# Patient Record
Sex: FEMALE | Race: WHITE | NOT HISPANIC OR LATINO | Employment: FULL TIME | URBAN - METROPOLITAN AREA
[De-identification: names, ages, dates, MRNs, and addresses within clinical notes are randomized per-mention and may not be internally consistent; named-entity substitution may affect disease eponyms.]

---

## 2020-12-02 LAB
ABO GROUP BLD: NORMAL
HIV 1+2 AB+HIV1 P24 AG SERPL QL IA: NEGATIVE
RH BLD: NORMAL
RUBV IGG SERPL IA-ACNC: NORMAL

## 2021-03-09 ENCOUNTER — TELEPHONE (OUTPATIENT)
Dept: OBGYN | Facility: CLINIC | Age: 26
End: 2021-03-09

## 2021-03-12 ENCOUNTER — GYNECOLOGY VISIT (OUTPATIENT)
Dept: OBGYN | Facility: CLINIC | Age: 26
End: 2021-03-12
Payer: COMMERCIAL

## 2021-03-12 DIAGNOSIS — E05.00 GRAVES DISEASE: ICD-10-CM

## 2021-03-12 DIAGNOSIS — Z3A.18 18 WEEKS GESTATION OF PREGNANCY: ICD-10-CM

## 2021-03-12 PROCEDURE — 59401 PR NEW OB VISIT: CPT | Performed by: OBSTETRICS & GYNECOLOGY

## 2021-03-12 NOTE — PROGRESS NOTES
Cc: New OB visit    HPI:  The patient is a 25 y.o.  3 para 0-0-2-0 at 18 weeks 6 days gestational age who presents as transfer care from [place].  Patient has had prenatal care prior to moving to [facility].  Records have been requested.    Patient has history of Graves' disease.  Currently being seen by endocrinology.  On PTU for management.    She presents for her new obstetric visit.    She denies fetal movement, denies vaginal bleeding, denies leakage of fluid, denies contractions.     She denies nausea/vomiting, headaches, or urinary symptoms.      OB History   No obstetric history on file.     Past Medical History:   Diagnosis Date   • Heart murmur      History reviewed. No pertinent surgical history.  Social History     Socioeconomic History   • Marital status: Single     Spouse name: Not on file   • Number of children: Not on file   • Years of education: Not on file   • Highest education level: Not on file   Occupational History   • Not on file   Tobacco Use   • Smoking status: Not on file   Substance and Sexual Activity   • Alcohol use: Not on file   • Drug use: Not on file   • Sexual activity: Not on file   Other Topics Concern   • Not on file   Social History Narrative   • Not on file     Social Determinants of Health     Financial Resource Strain:    • Difficulty of Paying Living Expenses:    Food Insecurity:    • Worried About Running Out of Food in the Last Year:    • Ran Out of Food in the Last Year:    Transportation Needs:    • Lack of Transportation (Medical):    • Lack of Transportation (Non-Medical):    Physical Activity:    • Days of Exercise per Week:    • Minutes of Exercise per Session:    Stress:    • Feeling of Stress :    Social Connections:    • Frequency of Communication with Friends and Family:    • Frequency of Social Gatherings with Friends and Family:    • Attends Worship Services:    • Active Member of Clubs or Organizations:    • Attends Club or Organization Meetings:    •  Marital Status:    Intimate Partner Violence:    • Fear of Current or Ex-Partner:    • Emotionally Abused:    • Physically Abused:    • Sexually Abused:      Family History   Problem Relation Age of Onset   • Diabetes Maternal Grandmother      Allergies:   Allergies as of 03/12/2021   • (Not on File)       PE:    There were no vitals taken for this visit.    General: no apparent distress, is well developed and well nourished  Head: normocephalic, atraumatic  Neck: neck is supple, non-tender, no thyromegaly, full range of motion  CVS: regular rate and rhythm, no peripheral edema  Lungs: Normal respiratory effort. Clear to auscultation bilaterally  Abdomen: Bowel sounds positive, nondistended, soft, nontender x4, no rebound or guarding.  Skin: No rashes, or ulcers or lesions seen  Psychiatric: Patient shows appropriate affect, is alert and oriented x3, intact judgment and insight.        A/P:  25 y.o. No obstetric history on file. Unknown based upon  No LMP recorded..  She is here for her new obstetric visit.    1. Graves disease  REFERRAL TO PERINATOLOGY   2. 18 weeks gestation of pregnancy         1. Ultrasound for dates and anatomy will be scheduled with high risk pregnancy center.  2.  Patient reports she obtained NIPT with her prior provider.  Awaiting results  3.  Patient has already had prenatal labs drawn with her prior provider, awaiting results.  4.  NOB packet given with ACOG prenatal book.  5.  Increase water intake and encouraged healthy nutrition.  6.  Referral to M made secondary to Graves' disease.  7.  Continue prenatal vitamins.  8.  Follow-up in 4 weeks.   9.  SAB precautions educated.    Patient reports she recently had her thyroid levels drawn.  Being seen by endocrinologist in [place].  Will need levels drawn every trimester    Patient will likely need TRAb levels drawn.  Referral to perinatology has been made.    Fetal heart tones 140 bpm    On ASA 81 mg prophylaxis    All questions answered

## 2021-03-27 ENCOUNTER — NURSE TRIAGE (OUTPATIENT)
Dept: HEALTH INFORMATION MANAGEMENT | Facility: OTHER | Age: 26
End: 2021-03-27

## 2021-03-27 NOTE — TELEPHONE ENCOUNTER
"Pt called stating she is about 21 weeks pregnant and is having left side cramping since around 10/11 this am (4-5 hours). Pt was told she has a fibroid on this side. Pt was told about prune juice for constipation.  Messaged OB  won massey for pt questions in regards to pain medication/bowel regimen.    Reason for Disposition  • Mild abdominal pain    Additional Information  • Negative: Followed an abdomen (stomach) injury  • Negative: [1] Having contractions or other symptoms of labor (such as vaginal pressure) AND [2] >= 37 weeks pregnant (i.e., term pregnancy)  • Negative: [1] Having contractions or other symptoms of labor (such as vaginal pressure) AND [2] < 37 weeks pregnant (i.e., )  • Negative: [1] Abdominal pain AND [2] pregnant < 20 weeks  • Negative: [1] Vomiting AND [2] contains red blood or black (\"coffee ground\") material  (Exception: few red streaks in vomit that only happened once)  • Negative: MODERATE-SEVERE abdominal pain (e.g., interferes with normal activities, awakens from sleep)  • Negative: Vaginal bleeding or spotting  • Negative: [1] Baby moving less today (e.g., kick count < 5 in 1 hour or < 10 in 2 hours) AND [2] pregnant 23 or more weeks  • Negative: Leakage of fluid from vagina  • Negative: New hand or face swelling  • Negative: New blurred vision or vision changes  • Negative: [1] SEVERE headache AND [2] not relieved with acetaminophen (e.g., Tylenol)  • Negative: [1] Upper abdominal pains AND [2] radiate into chest, with sour taste in mouth  • Negative: [1] Upper abdominal pains AND [2] occur regularly about 1 hour after meals  • Negative: Abdominal pain is a chronic symptom (recurrent or ongoing AND present > 4 weeks)  • Negative: Occasional brief sharp vaginal pains in third trimester, questions about  • Negative: Round ligament pain (previously diagnosed by physician), questions about  • Negative: Treating diarrhea, questions about  • Negative: Treating vomiting, " "questions about  • Negative: Unusual vaginal discharge (e.g., bad smelling, yellow, green, or foamy-white)  • Negative: Pain or burning with passing urine (urination)  • Negative: [1] MILD abdominal pain (e.g., doesn't interfere with normal activities) AND [2] constant AND [3] present > 2 hours  • Negative: [1] Intermittent lower abdominal pain AND [2] present > 24 hours  • Negative: Fever > 100.4 F (38.0 C)  • Negative: Blood in urine (red, pink, or tea-colored)  • Negative: White of the eyes have turned yellow (i.e., jaundice)  • Negative: Patient sounds very sick or weak to the triager    Answer Assessment - Initial Assessment Questions  1. LOCATION: \"Where does it hurt?\"       Left lower abdomen  2. RADIATION: \"Does the pain shoot anywhere else?\" (e.g., chest, back)      no  3. ONSET: \"When did the pain begin?\" (Minutes, hours or days ago)      today  4. ONSET: \"Gradual or sudden onset?\"      sudden  5. PATTERN: \"Does the pain come and go, or has it been constant since it started?\"       constant  6. SEVERITY: \"How bad is the pain?\" \"What does it keep you from doing?\"  (e.g., Scale 1-10; mild, moderate, or severe)    - MILD (1-3): doesn't interfere with normal activities, abdomen soft and not tender to touch     - MODERATE (4-7): interferes with normal activities or awakens from sleep, tender to touch     - SEVERE (8-10): excruciating pain, doubled over, unable to do any normal activities      6 with pain, 3 or 4 with walking  7. RECURRENT SYMPTOM: \"Have you ever had this type of abdominal pain before?\" If so, ask: \"When was the last time?\" and \"What happened that time?\"       no  8. CAUSE: \"What do you think is causing the abdominal pain?      Not sure  9. RELIEVING/AGGRAVATING FACTORS: \"What makes it better or worse?\" (e.g., antacids, bowel movement, movement)      Relieving with movement  10. OTHER SYMPTOMS: \"Has there been any vaginal bleeding, fever, vomiting, diarrhea, or urine problems?\"        no  11. " "HORACE: \"What date are you expecting to deliver?\"        August 4    Protocols used: PREGNANCY - ABDOMINAL PAIN GREATER THAN 20 WEEKS EGA-A-      "

## 2021-03-29 ENCOUNTER — APPOINTMENT (OUTPATIENT)
Dept: RADIOLOGY | Facility: MEDICAL CENTER | Age: 26
End: 2021-03-29
Attending: OBSTETRICS & GYNECOLOGY
Payer: COMMERCIAL

## 2021-03-29 ENCOUNTER — HOSPITAL ENCOUNTER (EMERGENCY)
Facility: MEDICAL CENTER | Age: 26
End: 2021-03-29
Attending: OBSTETRICS & GYNECOLOGY | Admitting: OBSTETRICS & GYNECOLOGY
Payer: COMMERCIAL

## 2021-03-29 VITALS
BODY MASS INDEX: 32.78 KG/M2 | DIASTOLIC BLOOD PRESSURE: 77 MMHG | TEMPERATURE: 98 F | HEIGHT: 64 IN | SYSTOLIC BLOOD PRESSURE: 122 MMHG | HEART RATE: 91 BPM | WEIGHT: 192 LBS | RESPIRATION RATE: 18 BRPM

## 2021-03-29 PROCEDURE — A9270 NON-COVERED ITEM OR SERVICE: HCPCS | Performed by: OBSTETRICS & GYNECOLOGY

## 2021-03-29 PROCEDURE — 76815 OB US LIMITED FETUS(S): CPT

## 2021-03-29 PROCEDURE — 99284 EMERGENCY DEPT VISIT MOD MDM: CPT

## 2021-03-29 PROCEDURE — 99284 EMERGENCY DEPT VISIT MOD MDM: CPT | Performed by: OBSTETRICS & GYNECOLOGY

## 2021-03-29 PROCEDURE — 700102 HCHG RX REV CODE 250 W/ 637 OVERRIDE(OP): Performed by: OBSTETRICS & GYNECOLOGY

## 2021-03-29 RX ORDER — ACETAMINOPHEN 500 MG
1000 TABLET ORAL ONCE
Status: COMPLETED | OUTPATIENT
Start: 2021-03-29 | End: 2021-03-29

## 2021-03-29 RX ORDER — INDOMETHACIN 50 MG/1
50 CAPSULE ORAL 4 TIMES DAILY
Qty: 8 CAPSULE | Refills: 0 | Status: SHIPPED | OUTPATIENT
Start: 2021-03-29 | End: 2021-03-31

## 2021-03-29 RX ORDER — INDOMETHACIN 50 MG/1
100 CAPSULE ORAL ONCE
Status: COMPLETED | OUTPATIENT
Start: 2021-03-29 | End: 2021-03-29

## 2021-03-29 RX ADMIN — INDOMETHACIN 100 MG: 50 CAPSULE ORAL at 13:53

## 2021-03-29 RX ADMIN — ACETAMINOPHEN 1000 MG: 500 TABLET, FILM COATED ORAL at 15:26

## 2021-03-29 NOTE — PROGRESS NOTES
EDC  2021     EGA   21w2d    1306: Pt presents with c/o UC's. Pt states she was in [place] this weekend for her brother's wedding, and while there she started colleen and went into the ER there. Pt states they gave her 3 doses of terbutaline and checked her cervix and her cervix was closed. Pt states she was discharged home with 25 mg of indomethacin to take every 6 hours, and had taken 4 doses already. Pt states the contractions calmed down, but picked back up while she was on the airplane home today, so she came straight to labor and delivery to be evaluated. Pt states she received early prenatal care in [place] and has had one visit with [facility], and went to [facility] for one visit so far. Pt goes to the [facility] center for her Grave's disease, and at her last appointment they found a 9 cm fibroid.     1324: Dr. Ugalde updated with patient's status, orders received to check cervix, and may take US monitor off since patient is 21w2d.     1325: SVE closed    1328: Dr. Ugalde updated with cervical status, orders received to give a one time dose of 100 mg of indocin.     1429: Ultrasound tech at bedside.     1508: Pt states her pain is a 7 using a pain scale 0 to 10.     1515: Dr. Ugalde updated with US results and patient's pain assessment.     1600: Pt states her pain is a little better since taking the tylenol, rating her pain at a 5 now.     1640: Discharge orders received from Dr. Ugalde.     1703: Discharge instructions gone over with patient, all questions and concerns addressed.

## 2021-03-29 NOTE — ED PROVIDER NOTES
Emergency Obstetric Consultation     Date of Service  3/29/2021    Reason for Consultation   contractions    History of Presenting Illness  25 y.o. female who presented 3/29/2021 with  contractions.  Has Graves disease, saw [facility] 2 weeks ago and was told had 10cm fundal fibroid that she was unaware of.  Was in [place] over the weekend for his brother's wedding, started colleen and went to the ED.  Was given terbutaline x3 and sent home with procardia prescription.  Continued to contract and therefore returned to hospital today.       Review of Systems  +CTX  -lof, vb  +FM    Obstetric History    OB History    Para Term  AB Living   1             SAB TAB Ectopic Molar Multiple Live Births                    # Outcome Date GA Lbr Gene/2nd Weight Sex Delivery Anes PTL Lv   1 Current                Gynecologic History  Patient's last menstrual period was 10/31/2020.  Pap history: no abnormal Pap smears  STI history: none    Medical History   has a past medical history of Heart murmur. She also has no past medical history of Addisons disease ([facility]), Adrenal disorder ([facility]), Allergy, Anemia, Anxiety, Arrhythmia, Arthritis, Asthma, Blood transfusion without reported diagnosis, Cancer ([facility]), Cataract, CHF (congestive heart failure) ([facility]), Clotting disorder ([facility]), COPD (chronic obstructive pulmonary disease) ([facility]), Cushings syndrome ([facility]), Depression, Diabetes ([facility]), Diabetic neuropathy ([facility]), GERD (gastroesophageal reflux disease), Glaucoma, Goiter, Head ache, HIV (human immunodeficiency virus infection) ([facility]), Hyperlipidemia, Hypertension, IBD (inflammatory bowel disease), Kidney disease, Meningitis, Migraine, Muscle disorder, Osteoporosis, Parathyroid disorder ([facility]), Pituitary disease ([facility]), Pulmonary emphysema ([facility]), Seizure ([facility]), Sickle cell disease ([facility]), Stroke ([facility]), Substance abuse ([facility]), Thyroid disease, Tuberculosis, or Urinary tract infection.    Surgical History   has no past  "surgical history on file.    Family History  family history includes Diabetes in her maternal grandmother.    Social History       Medications  None       Allergies  No Known Allergies    Physical Exam  Vitals:    21 1308   BP: 122/77   Pulse: 91   Resp: 18   Temp: 36.7 °C (98 °F)   TempSrc: Temporal   Weight: 87.1 kg (192 lb)   Height: 1.626 m (5' 4\")       Physical Exam   Constitutional: She is oriented to person, place, and time and well-developed, well-nourished, and in no distress.   Eyes: EOM are normal.   Pulmonary/Chest: Effort normal.   Abdominal: Soft. She exhibits no distension. There is no abdominal tenderness.   Genitourinary: Uterus is enlarged and tender (tender specifically over the fundal fibroid region, nontener uterus otherwise).    Genitourinary Comments: Cervix long and closed     Musculoskeletal:         General: Normal range of motion.   Neurological: She is alert and oriented to person, place, and time.   Skin: Skin is warm and dry.   Psychiatric: Mood and affect normal.       Laboratory               No results for input(s): NTPROBNP in the last 72 hours.           Assessment    Natalya Clifton 25 y.o. year old  21w2d with  contractions    Plan  1. Indocin for fibroid and contraction pain  2. If pain improves, will send home with 48hr course of indocin and extra strength tylenol   3. Advised to return if pain continues but understands that some pains are expected with the size of fibroid she has which can become painful during pregnancy as it degenerates.   4.  JILLIAN and US to rule out necrosis of fibroid prior to discharge  5.  F/u with OB as scheduled or return if pain persists    Leyla Ugalde D.O.  "

## 2021-03-29 NOTE — DISCHARGE INSTRUCTIONS
Uterine Fibroid  A uterine fibroid is a growth (tumor) in the uterus. It is not cancerous. Some fibroids cause pain or heavy bleeding during and in between periods.  HOME CARE  Home care depends on how you were treated. In general:  · Keep all doctor visits with your doctor.  · Only take medicine as told by your doctor. Do not take aspirin.  · If you soak your tampon or pad in 30 minutes or less, call your doctor right away.  · If you have painful periods that are not heavy, lie down with your feet up. Put cold packs on your belly (abdomen).  · If you have heavy periods, tell your doctor how many pads or tampons you use in a month.  · Talk to your doctor about taking iron pills.  · Eat green vegetables.  · If you were given hormone medicines, take them as told.  · If you need surgery, ask your doctor for more information on that surgery.  GET HELP RIGHT AWAY IF:   · You have lower belly (pelvic) pain or cramps not helped by medicine.  · You have sudden lower belly pain that gets worse.  · You have more bleeding between and during your periods.  · You feel lightheaded or faint.  MAKE SURE YOU:   · Understand these instructions.  · Will watch your condition.  · Will get help right away if you are not doing well or get worse.  Document Released: 03/16/2010 Document Revised: 04/14/2014 Document Reviewed: 12/18/2012  [employer]® Patient Information ©2014 [employer].  Pre-term Labor (<37 weeks):  Call your physician or return to the hospital if:  · You have painless regular contractions more than 4 in one hour.  · Your water breaks (remember time and color).  · You have menstrual-like cramps, a low dull backache or pressure in your pelvis or back.  · Your baby does not move enough to complete the daily kick count (10 movements in 2 hours).  · Your baby moves much less often than on the days before or you have not felt your baby move all day.  · Please review the MEDICATION LIST section of your AFTER VISIT SUMMARY  document.  · Take your medication as prescribed

## 2021-04-12 ENCOUNTER — ROUTINE PRENATAL (OUTPATIENT)
Dept: OBGYN | Facility: CLINIC | Age: 26
End: 2021-04-12
Payer: COMMERCIAL

## 2021-04-12 VITALS — SYSTOLIC BLOOD PRESSURE: 124 MMHG | BODY MASS INDEX: 32.1 KG/M2 | WEIGHT: 187 LBS | DIASTOLIC BLOOD PRESSURE: 81 MMHG

## 2021-04-12 DIAGNOSIS — I10 CHRONIC HYPERTENSION: ICD-10-CM

## 2021-04-12 DIAGNOSIS — O09.90 SUPERVISION OF HIGH RISK PREGNANCY, ANTEPARTUM: Primary | ICD-10-CM

## 2021-04-12 DIAGNOSIS — E05.00 GRAVES DISEASE: ICD-10-CM

## 2021-04-12 PROBLEM — Z3A.18 18 WEEKS GESTATION OF PREGNANCY: Status: RESOLVED | Noted: 2021-03-12 | Resolved: 2021-04-12

## 2021-04-12 PROBLEM — D25.9 UTERINE FIBROID: Status: ACTIVE | Noted: 2021-04-12

## 2021-04-12 PROCEDURE — 90040 PR PRENATAL FOLLOW UP: CPT | Performed by: NURSE PRACTITIONER

## 2021-04-12 RX ORDER — ASPIRIN 81 MG/1
81 TABLET, CHEWABLE ORAL DAILY
Status: ON HOLD | COMMUNITY
End: 2021-06-20

## 2021-04-12 NOTE — PROGRESS NOTES
S) Pt is a 25 y.o.   at 23w2d  gestation. Routine prenatal care today. Seen in hospital on 3/29 for ctx. She continues to c/o abdominal ctx, especially when up and walking and active. However after discussing it with her,  It sounds like she is having round ligament pain.  No abdominal firmess when she gets the ctx, just pulling and sharp pain to the areas of her round ligaments.  She also states she used to have BM's every meal prior to pregnancy and now only every couple of days.  So some of her abdominal discomfort may be from her not having frequent BM's.  She is trying prune juice daily.      She is also having some tingling in her toes when she is in the upright position for too long.  If she reclines slightly it goes away.      Fetal movement Normal  Cramping no  VB no  LOF no   Denies dysuria. Generally feels well today. Good self-care activities identified. Denies headaches, swelling, visual changes, or epigastric pain .     O) See flow sheet for vital signs and fetal measurements.          Labs:       PNL: WNL       GCT:       AFP: normal       GBS: N/A       Pertinent ultrasound - 3/18/21 [facility], normal anatomy scan           A) IUP at 23w2d       S=D         Patient Active Problem List    Diagnosis Date Noted   • 18 weeks gestation of pregnancy 2021          SVE: deferred         TDAP: no       FLU: no        BTL: no       : no       C/S Consent: no       IOL or C/S scheduled: no       LAST PAP:          P) s/s ptl vs general discomforts. Fetal movements reviewed. General ed and anticipatory guidance. Nutrition/exercise/vitamin. Plans breast Plans pp contraception- unsure  Continue PNV.   Given 3rd trimester labs with instructions  Discussed pregnancy belt with her when up and around.    Also discussed other safe medications to aid in BM such as colace, and may increase prune juice to BID.  If feeling constipated may use dulcolax or miralax.  Has appt with [facility] this week.   Continue with  endocrinology for Graves management  RTC 4 weeks or PRN.

## 2021-04-12 NOTE — PROGRESS NOTES
Pt here today for OB follow up  Reports +FM  WT: 187 lb  BP: 124/81  Preferred pharmacy verified with pt.  Pt states she had a few visit to the ER du to  contraction. Pt states she was told she has a big fibroid.   Pt states she is still having the contractions when she is active. Pt also reports having pins and needles of her feet is she is sitting straight x 2 days. States no other complaints.   Gregor # [phone]  3rd trimester labs ordered today,pt given instructions.

## 2021-04-26 ENCOUNTER — TELEPHONE (OUTPATIENT)
Dept: OBGYN | Facility: CLINIC | Age: 26
End: 2021-04-26

## 2021-04-26 NOTE — TELEPHONE ENCOUNTER
Called and spoke with Pt, advised her that there's 2 forms that she had submitted and we need some clarifications. Pt apologized for the confusions and clarified that she only need the intermittent FMLA from our office. Informed Pt that her HORACE is on 8/7/2021 and that we can give her 12 wks starting her estimated delivery date. Pt states that she just need one form from us and she understood the policy for filling out her form. Pt verbalized understanding and has no further questions.

## 2021-04-26 NOTE — TELEPHONE ENCOUNTER
Called and spoke with Jarrett () regarding Natalya Clifton's FMLA form. Informed Jarrett that I tried to call on 4/22/2021 at their office to reach him as I have few questions for clarifications. Jarrett explained that Pt had 2 claims and asked for a continuous leave until giving birth. Informed Jarrett that I will contact Natalya Clifton to verify requested dates for her absences. Jarrett states that Pt's continuous leave forms is due on 5/10/2021. Will contact Natalya to verify informations.

## 2021-04-26 NOTE — TELEPHONE ENCOUNTER
Called Pt but N/A, LVMTCB. If Pt calls back, please inform Pt that we are working on her FMLA/STD forms but she needs to call [employer] to inform them that we are still working on her forms. Thank you.

## 2021-04-30 ENCOUNTER — HOSPITAL ENCOUNTER (OUTPATIENT)
Dept: LAB | Facility: MEDICAL CENTER | Age: 26
End: 2021-04-30
Attending: NURSE PRACTITIONER
Payer: COMMERCIAL

## 2021-04-30 DIAGNOSIS — O09.90 SUPERVISION OF HIGH RISK PREGNANCY, ANTEPARTUM: ICD-10-CM

## 2021-04-30 LAB
ERYTHROCYTE [DISTWIDTH] IN BLOOD BY AUTOMATED COUNT: 43.5 FL (ref 35.9–50)
HCT VFR BLD AUTO: 31.6 % (ref 37–47)
HGB BLD-MCNC: 10 G/DL (ref 12–16)
MCH RBC QN AUTO: 28.5 PG (ref 27–33)
MCHC RBC AUTO-ENTMCNC: 31.6 G/DL (ref 33.6–35)
MCV RBC AUTO: 90 FL (ref 81.4–97.8)
PLATELET # BLD AUTO: 297 K/UL (ref 164–446)
PMV BLD AUTO: 12.3 FL (ref 9–12.9)
RBC # BLD AUTO: 3.51 M/UL (ref 4.2–5.4)
WBC # BLD AUTO: 7.4 K/UL (ref 4.8–10.8)

## 2021-04-30 PROCEDURE — 86780 TREPONEMA PALLIDUM: CPT

## 2021-04-30 PROCEDURE — 36415 COLL VENOUS BLD VENIPUNCTURE: CPT

## 2021-04-30 PROCEDURE — 82950 GLUCOSE TEST: CPT

## 2021-04-30 PROCEDURE — 85027 COMPLETE CBC AUTOMATED: CPT

## 2021-05-01 LAB
GLUCOSE 1H P 50 G GLC PO SERPL-MCNC: 109 MG/DL (ref 70–139)
TREPONEMA PALLIDUM IGG+IGM AB [PRESENCE] IN SERUM OR PLASMA BY IMMUNOASSAY: NORMAL

## 2021-05-03 RX ORDER — FERROUS SULFATE 325(65) MG
325 TABLET ORAL DAILY
Qty: 60 TABLET | Refills: 0 | Status: SHIPPED | OUTPATIENT
Start: 2021-05-03

## 2021-05-10 ENCOUNTER — ROUTINE PRENATAL (OUTPATIENT)
Dept: OBGYN | Facility: CLINIC | Age: 26
End: 2021-05-10
Payer: COMMERCIAL

## 2021-05-10 VITALS — BODY MASS INDEX: 32.96 KG/M2 | SYSTOLIC BLOOD PRESSURE: 126 MMHG | WEIGHT: 192 LBS | DIASTOLIC BLOOD PRESSURE: 76 MMHG

## 2021-05-10 DIAGNOSIS — O09.92 SUPERVISION OF HIGH RISK PREGNANCY IN SECOND TRIMESTER: ICD-10-CM

## 2021-05-10 PROCEDURE — 90040 PR PRENATAL FOLLOW UP: CPT | Performed by: OBSTETRICS & GYNECOLOGY

## 2021-05-10 NOTE — NON-PROVIDER
Pt here today for OB follow up  Pt states no complaints  Reports +FM  Good # [phone]  Pharmacy Confirmed.  Chaperone offered and declined  Kick count given

## 2021-05-10 NOTE — PROGRESS NOTES
No contractions, leaking fluid, vaginal bleeding.  Fetus is active.  Pain secondary to fibroid has improved.  The patient continues to see her endocrinologist via telemed in [place].  She reports recent labs from [facility] revealed a low TSH, and normal T3-4.  We will plan to repeat labs in 2 weeks.  She is not taking PTU currently.  History of chronic hypertension, BP is normal, no antihypertensive currently, taking baby aspirin.  Glucola normal.  Hemoglobin 10.0, normal MCV.  Taking FeSo4 325 mg bid.   Follow-up with high risk pregnancy center as scheduled in 2 weeks.  Echocardiogram scheduled on Charleen 10.   labor precautions discussed.  Follow-up 2 weeks.

## 2021-05-10 NOTE — LETTER
"Count Your Baby's Movements  Another step to a healthy delivery                  How to use this chart    One way for your physician to keep track of your baby's health is by knowing how often the baby moves (or \"kicks\") in your womb.  You can help your physician to do this by using this chart every day.    Every day, you should see how many hours it takes for your baby to move 10 times.  Start in the morning, as soon as you get up.    · First, write down the time your baby moves until you get to 10.  · Check off one box every time your baby moves until you get to 10.  · Write down the time you finished counting in the last column.  · Total how long it took to count up all 10 movements.  · Finally, fill in the box that shows how long this took.  After counting 10 movements, you no longer have to count any more that day.  The next morning, just start counting again as soon as you get up.    What should you call a \"movement\"?  It is hard to say, because it will feel different from one mother to another and from one pregnancy to the next.  The important thing is that you count the movements the same way throughout your pregnancy.  If you have more questions, you should ask your physician.    Count carefully every day!  SAMPLE:  Week 28    How many hours did it take to feel 10 movements?       Start  Time     1     2     3     4     5     6     7     8     9     10   Finish Time   Mon 8:20 ·  ·  ·  ·  ·  ·  ·  ·  ·  ·  11:40   Tue Wed Thu               Fri               Sat               Sun                 IMPORTANT: You should contact your physician if it takes more than two hours for you to feel 10 movements.  Each morning, write down the time and start to count the movements of your baby.  Keep track by checking off one box every time you feel one movement.  When you have felt 10 \"kicks\", write down the time you finished counting in the last column.  Then fill in the   box (over the check " jesus) for the number of hours it took.  Be sure to read the complete instructions on the previous page.

## 2021-06-07 ENCOUNTER — ROUTINE PRENATAL (OUTPATIENT)
Dept: OBGYN | Facility: CLINIC | Age: 26
End: 2021-06-07

## 2021-06-07 VITALS — BODY MASS INDEX: 33.81 KG/M2 | SYSTOLIC BLOOD PRESSURE: 124 MMHG | WEIGHT: 197 LBS | DIASTOLIC BLOOD PRESSURE: 86 MMHG

## 2021-06-07 DIAGNOSIS — O09.90 SUPERVISION OF HIGH RISK PREGNANCY, ANTEPARTUM: ICD-10-CM

## 2021-06-07 PROCEDURE — 90040 PR PRENATAL FOLLOW UP: CPT | Performed by: OBSTETRICS & GYNECOLOGY

## 2021-06-07 NOTE — PROGRESS NOTES
OB Followup;    31w2d    Patient Active Problem List    Diagnosis Date Noted   • Graves disease 2021   • Uterine fibroid-per pt as told by [facility], no note in US x 2 but can palpate 2021   • Chronic hypertension 2021       Vitals:    21 0859   BP: 124/86   Weight: 89.4 kg (197 lb)       Patient presents for followup of OB care. Currently doing well . Good fetal movement no leakage of fluid no contractions or vaginal bleeding        Size equals dates, normal fetal heart rate      Labs; patient has Graves' disease and currently followed by endocrinology in [place] via telemedicine-patient to draw thyroid function tests and review them with endocrinologist in [place] via telemedicine  Patient continues on ASA 81 mg p.o. daily      Labor precautions given  Discussed proper weight gain during pregnancy.    Signs and symptoms of labor/ labor discussed   Discussed our group's policy on prenatal checkups and delivery  Discussed Covid precautions  Discussed Covid vaccination recommendations from CDC/ACOG  Discussed proper exercise during pregnancy  Discussed proper oral fluid hydration  Currently taking prenatal vitamins as instructed  Reviewed fetal kick counts and appropriate fetal movement during pregnancy  Reviewed postpartum birth control methods  All questions answered in detail    Followup in  2 weeks

## 2021-06-07 NOTE — PROGRESS NOTES
Pt here today for OB follow up  Pt states no VB or LOF   Reports +FM  Good # [phone]   Pharmacy Confirmed.  Chaperone offered and provided.

## 2021-06-10 ENCOUNTER — APPOINTMENT (OUTPATIENT)
Dept: CARDIOLOGY | Facility: MEDICAL CENTER | Age: 26
End: 2021-06-10
Attending: OBSTETRICS & GYNECOLOGY
Payer: COMMERCIAL

## 2021-06-16 ENCOUNTER — HOSPITAL ENCOUNTER (EMERGENCY)
Facility: MEDICAL CENTER | Age: 26
End: 2021-06-16
Attending: OBSTETRICS & GYNECOLOGY | Admitting: OBSTETRICS & GYNECOLOGY
Payer: COMMERCIAL

## 2021-06-16 ENCOUNTER — APPOINTMENT (OUTPATIENT)
Dept: RADIOLOGY | Facility: MEDICAL CENTER | Age: 26
End: 2021-06-16
Attending: NURSE PRACTITIONER
Payer: COMMERCIAL

## 2021-06-16 VITALS
WEIGHT: 197 LBS | HEIGHT: 64 IN | TEMPERATURE: 98.9 F | DIASTOLIC BLOOD PRESSURE: 83 MMHG | BODY MASS INDEX: 33.63 KG/M2 | HEART RATE: 86 BPM | SYSTOLIC BLOOD PRESSURE: 132 MMHG | RESPIRATION RATE: 17 BRPM

## 2021-06-16 PROCEDURE — 59025 FETAL NON-STRESS TEST: CPT | Mod: 26 | Performed by: NURSE PRACTITIONER

## 2021-06-16 PROCEDURE — 76819 FETAL BIOPHYS PROFIL W/O NST: CPT

## 2021-06-16 PROCEDURE — 99284 EMERGENCY DEPT VISIT MOD MDM: CPT

## 2021-06-16 PROCEDURE — 59025 FETAL NON-STRESS TEST: CPT

## 2021-06-16 PROCEDURE — 99283 EMERGENCY DEPT VISIT LOW MDM: CPT | Mod: 25 | Performed by: NURSE PRACTITIONER

## 2021-06-16 ASSESSMENT — PAIN SCALES - GENERAL: PAINLEVEL: 0 - NO PAIN

## 2021-06-17 NOTE — ED PROVIDER NOTES
"Emergency Obstetric Consultation     Date of Service  2021      PATIENT ID:  NAME:  Natalya Clifton  MRN:               [id number]  YOB: 1995     25 y.o. female  at 32w4d.    Subjective: Pt her for decreased FM  Pregnancy complicated by essential HTN, followed by [facility]    negative  For CTXS.   negative Feels pain   negative for LOF  negative for vaginal bleeding.   unknown for fetal movement    ROS: Patient denies any fever chills, nausea, vomiting, headache, chest pain, shortness of breath, or dysuria or unusual swelling of hands or feet.     Objective:    Vitals:    21   BP: 132/83   Pulse: 86   Resp: 17   Temp: 37.2 °C (98.9 °F)   TempSrc: Temporal   Weight: 89.4 kg (197 lb)   Height: 1.626 m (5' 4\")     Temp (24hrs), Av.2 °C (98.9 °F), Min:37.2 °C (98.9 °F), Max:37.2 °C (98.9 °F)    General: No acute distress, resting comfortably in bed.  HEENT: normocephalic, nontraumatic, PERRLA, EOMI  Cardiovascular: Heart RRR with no murmurs, rubs or gallops. Distal Pulses 2+  Respiratory: symmetric chest expansion, lungs CTAB, with no wheezes, rales, rhonci  Abdomen: gravid, nontender  Musculoskeletal: strength 5/5 in four extremities  Neuro: non focal with no numbness, tingling or changes in sensation    Cervix: deferred  [place]: Uterine Contractions none   FHRM: Baseline 145, Accels to 160, 1 prolonged decel, does not repeat. moderate variability    BPP: 8/8    Reactive NST per my read     Assessment: 25 y.o. female  at 32w4d.    Plan:   1. Patient is cleared to return home.   2. F/U in office for [facility] and [facility] on friday  3. Instructions for FM given          ALIYAH Salinas"

## 2021-06-17 NOTE — PROGRESS NOTES
2005- Pt presents to L&D with c/o decreased fetal movement. Pt states she has had diarrhea and 1 case of vomitting since around 3 am. Her pregnancy is complicated by an anterior placenta which she states doesn't allow her to feel the baby move very well, she states she has not felt him move today and has tried all her tricks so she wanted to come in and be checked. EFM applied, + FHT. Pt denies any ctxs, LOF, or VB. Vitals WNL. Pt is able to hydrate PO without difficulty.     2058- TEA Salguero CNM called with report, orders received for a BPP.     2100- TEA Salguero CNM at bedside to discuss POC with pt.     2214- BPP 8/8 orders received to d/c pt home with pre term labor precautions. Pt has an appt with [facility] on Friday 6/18.    2216- D/C instructions reviewed at bedside. All questions and concerns were answered.     2235- Pt left ambulatory in stable condition accompanied by her .

## 2021-06-18 ENCOUNTER — HOSPITAL ENCOUNTER (INPATIENT)
Facility: MEDICAL CENTER | Age: 26
LOS: 2 days | End: 2021-06-20
Attending: OBSTETRICS & GYNECOLOGY | Admitting: OBSTETRICS & GYNECOLOGY
Payer: COMMERCIAL

## 2021-06-18 DIAGNOSIS — O36.4XX0 IUFD AT 20 WEEKS OR MORE OF GESTATION: ICD-10-CM

## 2021-06-18 LAB
ADULT RBCS COUNTED 8505ARB2: 4950 ADULT RBC
APTT PPP: 28 SEC (ref 24.7–36)
BASOPHILS # BLD AUTO: 0.4 % (ref 0–1.8)
BASOPHILS # BLD: 0.03 K/UL (ref 0–0.12)
EOSINOPHIL # BLD AUTO: 0.01 K/UL (ref 0–0.51)
EOSINOPHIL NFR BLD: 0.1 % (ref 0–6.9)
ERYTHROCYTE [DISTWIDTH] IN BLOOD BY AUTOMATED COUNT: 42.3 FL (ref 35.9–50)
FETAL RBC PERCENT 8505FRBP: 0 %
FETAL STAIN FRBC 8505FRBC: 0 FETAL RBC
FIBRINOGEN PPP-MCNC: 655 MG/DL (ref 215–460)
HCT VFR BLD AUTO: 36.2 % (ref 37–47)
HGB BLD-MCNC: 11.5 G/DL (ref 12–16)
HOLDING TUBE BB 8507: NORMAL
IMM GRANULOCYTES # BLD AUTO: 0.04 K/UL (ref 0–0.11)
IMM GRANULOCYTES NFR BLD AUTO: 0.5 % (ref 0–0.9)
INR PPP: 0.99 (ref 0.87–1.13)
LYMPHOCYTES # BLD AUTO: 1.24 K/UL (ref 1–4.8)
LYMPHOCYTES NFR BLD: 15 % (ref 22–41)
MCH RBC QN AUTO: 27.3 PG (ref 27–33)
MCHC RBC AUTO-ENTMCNC: 31.8 G/DL (ref 33.6–35)
MCV RBC AUTO: 85.8 FL (ref 81.4–97.8)
MONOCYTES # BLD AUTO: 0.54 K/UL (ref 0–0.85)
MONOCYTES NFR BLD AUTO: 6.5 % (ref 0–13.4)
NEUTROPHILS # BLD AUTO: 6.41 K/UL (ref 2–7.15)
NEUTROPHILS NFR BLD: 77.5 % (ref 44–72)
NRBC # BLD AUTO: 0 K/UL
NRBC BLD-RTO: 0 /100 WBC
NUMBER OF RH DOSES IND 8505RD: NORMAL
NUMBER OF RH DOSES IND 8505RD: NORMAL # RHIG
PLATELET # BLD AUTO: 282 K/UL (ref 164–446)
PMV BLD AUTO: 11.8 FL (ref 9–12.9)
PROTHROMBIN TIME: 12.8 SEC (ref 12–14.6)
RBC # BLD AUTO: 4.22 M/UL (ref 4.2–5.4)
SARS-COV+SARS-COV-2 AG RESP QL IA.RAPID: NOTDETECTED
SPECIMEN SOURCE: NORMAL
WBC # BLD AUTO: 8.3 K/UL (ref 4.8–10.8)
WEAK D AG RBC QL: NORMAL

## 2021-06-18 PROCEDURE — U0005 INFEC AGEN DETEC AMPLI PROBE: HCPCS

## 2021-06-18 PROCEDURE — 36415 COLL VENOUS BLD VENIPUNCTURE: CPT

## 2021-06-18 PROCEDURE — 85610 PROTHROMBIN TIME: CPT

## 2021-06-18 PROCEDURE — A9270 NON-COVERED ITEM OR SERVICE: HCPCS | Performed by: NURSE PRACTITIONER

## 2021-06-18 PROCEDURE — 85460 HEMOGLOBIN FETAL: CPT

## 2021-06-18 PROCEDURE — 770002 HCHG ROOM/CARE - OB PRIVATE (112)

## 2021-06-18 PROCEDURE — U0003 INFECTIOUS AGENT DETECTION BY NUCLEIC ACID (DNA OR RNA); SEVERE ACUTE RESPIRATORY SYNDROME CORONAVIRUS 2 (SARS-COV-2) (CORONAVIRUS DISEASE [COVID-19]), AMPLIFIED PROBE TECHNIQUE, MAKING USE OF HIGH THROUGHPUT TECHNOLOGIES AS DESCRIBED BY CMS-2020-01-R: HCPCS

## 2021-06-18 PROCEDURE — 86901 BLOOD TYPING SEROLOGIC RH(D): CPT

## 2021-06-18 PROCEDURE — 700102 HCHG RX REV CODE 250 W/ 637 OVERRIDE(OP): Performed by: OBSTETRICS & GYNECOLOGY

## 2021-06-18 PROCEDURE — 85730 THROMBOPLASTIN TIME PARTIAL: CPT

## 2021-06-18 PROCEDURE — 87426 SARSCOV CORONAVIRUS AG IA: CPT

## 2021-06-18 PROCEDURE — A9270 NON-COVERED ITEM OR SERVICE: HCPCS | Performed by: OBSTETRICS & GYNECOLOGY

## 2021-06-18 PROCEDURE — 85025 COMPLETE CBC W/AUTO DIFF WBC: CPT

## 2021-06-18 PROCEDURE — 700102 HCHG RX REV CODE 250 W/ 637 OVERRIDE(OP): Performed by: NURSE PRACTITIONER

## 2021-06-18 PROCEDURE — 85384 FIBRINOGEN ACTIVITY: CPT

## 2021-06-18 RX ORDER — IBUPROFEN 600 MG/1
600 TABLET ORAL EVERY 6 HOURS PRN
Status: DISCONTINUED | OUTPATIENT
Start: 2021-06-18 | End: 2021-06-20

## 2021-06-18 RX ORDER — HYDROXYZINE 50 MG/1
50 TABLET, FILM COATED ORAL EVERY 6 HOURS PRN
Status: DISCONTINUED | OUTPATIENT
Start: 2021-06-18 | End: 2021-06-20 | Stop reason: HOSPADM

## 2021-06-18 RX ADMIN — MISOPROSTOL 100 MCG: 100 TABLET ORAL at 21:23

## 2021-06-18 RX ADMIN — IBUPROFEN 600 MG: 600 TABLET, FILM COATED ORAL at 21:22

## 2021-06-18 ASSESSMENT — LIFESTYLE VARIABLES
CONSUMPTION TOTAL: INCOMPLETE
TOTAL SCORE: 0
HAVE PEOPLE ANNOYED YOU BY CRITICIZING YOUR DRINKING: NO
TOTAL SCORE: 0
EVER HAD A DRINK FIRST THING IN THE MORNING TO STEADY YOUR NERVES TO GET RID OF A HANGOVER: NO
HAVE YOU EVER FELT YOU SHOULD CUT DOWN ON YOUR DRINKING: NO
ALCOHOL_USE: NO
EVER FELT BAD OR GUILTY ABOUT YOUR DRINKING: NO
TOTAL SCORE: 0

## 2021-06-18 ASSESSMENT — PAIN DESCRIPTION - PAIN TYPE
TYPE: ACUTE PAIN
TYPE: ACUTE PAIN

## 2021-06-18 ASSESSMENT — PAIN SCALES - GENERAL: PAINLEVEL: 0 - NO PAIN

## 2021-06-18 ASSESSMENT — PATIENT HEALTH QUESTIONNAIRE - PHQ9
SUM OF ALL RESPONSES TO PHQ9 QUESTIONS 1 AND 2: 0
2. FEELING DOWN, DEPRESSED, IRRITABLE, OR HOPELESS: NOT AT ALL
1. LITTLE INTEREST OR PLEASURE IN DOING THINGS: NOT AT ALL

## 2021-06-19 ENCOUNTER — ANESTHESIA EVENT (OUTPATIENT)
Dept: ANESTHESIOLOGY | Facility: MEDICAL CENTER | Age: 26
End: 2021-06-19
Payer: COMMERCIAL

## 2021-06-19 ENCOUNTER — ANESTHESIA (OUTPATIENT)
Dept: ANESTHESIOLOGY | Facility: MEDICAL CENTER | Age: 26
End: 2021-06-19
Payer: COMMERCIAL

## 2021-06-19 LAB
EKG IMPRESSION: NORMAL
SARS-COV-2 RNA RESP QL NAA+PROBE: NOTDETECTED
SPECIMEN SOURCE: NORMAL
T4 FREE SERPL-MCNC: 1.48 NG/DL (ref 0.93–1.7)
TSH SERPL DL<=0.005 MIU/L-ACNC: <0.005 UIU/ML (ref 0.38–5.33)

## 2021-06-19 PROCEDURE — 84439 ASSAY OF FREE THYROXINE: CPT

## 2021-06-19 PROCEDURE — 700102 HCHG RX REV CODE 250 W/ 637 OVERRIDE(OP): Performed by: OBSTETRICS & GYNECOLOGY

## 2021-06-19 PROCEDURE — 700111 HCHG RX REV CODE 636 W/ 250 OVERRIDE (IP)

## 2021-06-19 PROCEDURE — 700105 HCHG RX REV CODE 258: Performed by: ANESTHESIOLOGY

## 2021-06-19 PROCEDURE — 700102 HCHG RX REV CODE 250 W/ 637 OVERRIDE(OP)

## 2021-06-19 PROCEDURE — 84443 ASSAY THYROID STIM HORMONE: CPT

## 2021-06-19 PROCEDURE — 93005 ELECTROCARDIOGRAM TRACING: CPT | Performed by: OBSTETRICS & GYNECOLOGY

## 2021-06-19 PROCEDURE — 700101 HCHG RX REV CODE 250: Performed by: ANESTHESIOLOGY

## 2021-06-19 PROCEDURE — 93010 ELECTROCARDIOGRAM REPORT: CPT | Performed by: INTERNAL MEDICINE

## 2021-06-19 PROCEDURE — A9270 NON-COVERED ITEM OR SERVICE: HCPCS | Performed by: OBSTETRICS & GYNECOLOGY

## 2021-06-19 PROCEDURE — A9270 NON-COVERED ITEM OR SERVICE: HCPCS

## 2021-06-19 PROCEDURE — 770002 HCHG ROOM/CARE - OB PRIVATE (112)

## 2021-06-19 PROCEDURE — 36415 COLL VENOUS BLD VENIPUNCTURE: CPT

## 2021-06-19 PROCEDURE — 700111 HCHG RX REV CODE 636 W/ 250 OVERRIDE (IP): Performed by: OBSTETRICS & GYNECOLOGY

## 2021-06-19 PROCEDURE — 700105 HCHG RX REV CODE 258: Performed by: OBSTETRICS & GYNECOLOGY

## 2021-06-19 PROCEDURE — 700111 HCHG RX REV CODE 636 W/ 250 OVERRIDE (IP): Performed by: ANESTHESIOLOGY

## 2021-06-19 RX ORDER — SODIUM CHLORIDE, SODIUM LACTATE, POTASSIUM CHLORIDE, CALCIUM CHLORIDE 600; 310; 30; 20 MG/100ML; MG/100ML; MG/100ML; MG/100ML
1000 INJECTION, SOLUTION INTRAVENOUS CONTINUOUS
Status: DISCONTINUED | OUTPATIENT
Start: 2021-06-19 | End: 2021-06-20 | Stop reason: HOSPADM

## 2021-06-19 RX ORDER — MISOPROSTOL 200 UG/1
200 TABLET ORAL ONCE
Status: COMPLETED | OUTPATIENT
Start: 2021-06-19 | End: 2021-06-19

## 2021-06-19 RX ORDER — SODIUM CHLORIDE, SODIUM LACTATE, POTASSIUM CHLORIDE, AND CALCIUM CHLORIDE .6; .31; .03; .02 G/100ML; G/100ML; G/100ML; G/100ML
250 INJECTION, SOLUTION INTRAVENOUS PRN
Status: DISCONTINUED | OUTPATIENT
Start: 2021-06-19 | End: 2021-06-20 | Stop reason: HOSPADM

## 2021-06-19 RX ORDER — MISOPROSTOL 200 UG/1
200 TABLET ORAL EVERY 4 HOURS PRN
Status: DISCONTINUED | OUTPATIENT
Start: 2021-06-19 | End: 2021-06-20 | Stop reason: HOSPADM

## 2021-06-19 RX ORDER — ROPIVACAINE HYDROCHLORIDE 2 MG/ML
INJECTION, SOLUTION EPIDURAL; INFILTRATION; PERINEURAL
Status: COMPLETED
Start: 2021-06-19 | End: 2021-06-19

## 2021-06-19 RX ORDER — ROPIVACAINE HYDROCHLORIDE 2 MG/ML
INJECTION, SOLUTION EPIDURAL; INFILTRATION; PERINEURAL CONTINUOUS
Status: DISCONTINUED | OUTPATIENT
Start: 2021-06-19 | End: 2021-06-20 | Stop reason: HOSPADM

## 2021-06-19 RX ORDER — BUPIVACAINE HYDROCHLORIDE 2.5 MG/ML
INJECTION, SOLUTION EPIDURAL; INFILTRATION; INTRACAUDAL
Status: ACTIVE
Start: 2021-06-19 | End: 2021-06-20

## 2021-06-19 RX ORDER — MISOPROSTOL 200 UG/1
TABLET ORAL
Status: COMPLETED
Start: 2021-06-19 | End: 2021-06-19

## 2021-06-19 RX ORDER — LIDOCAINE HYDROCHLORIDE AND EPINEPHRINE 15; 5 MG/ML; UG/ML
INJECTION, SOLUTION EPIDURAL
Status: COMPLETED | OUTPATIENT
Start: 2021-06-19 | End: 2021-06-19

## 2021-06-19 RX ORDER — SODIUM CHLORIDE, SODIUM LACTATE, POTASSIUM CHLORIDE, AND CALCIUM CHLORIDE .6; .31; .03; .02 G/100ML; G/100ML; G/100ML; G/100ML
1000 INJECTION, SOLUTION INTRAVENOUS
Status: COMPLETED | OUTPATIENT
Start: 2021-06-19 | End: 2021-06-19

## 2021-06-19 RX ADMIN — ROPIVACAINE HYDROCHLORIDE 200 MG: 2 INJECTION, SOLUTION EPIDURAL; INFILTRATION at 06:34

## 2021-06-19 RX ADMIN — SODIUM CHLORIDE, POTASSIUM CHLORIDE, SODIUM LACTATE AND CALCIUM CHLORIDE 1000 ML: 600; 310; 30; 20 INJECTION, SOLUTION INTRAVENOUS at 06:00

## 2021-06-19 RX ADMIN — OXYTOCIN 1 MILLI-UNITS/MIN: 10 INJECTION, SOLUTION INTRAMUSCULAR; INTRAVENOUS at 13:21

## 2021-06-19 RX ADMIN — ROPIVACAINE HYDROCHLORIDE: 2 INJECTION, SOLUTION EPIDURAL; INFILTRATION at 16:00

## 2021-06-19 RX ADMIN — MISOPROSTOL 200 MCG: 200 TABLET ORAL at 02:00

## 2021-06-19 RX ADMIN — ROPIVACAINE HYDROCHLORIDE: 2 INJECTION, SOLUTION EPIDURAL; INFILTRATION at 21:36

## 2021-06-19 RX ADMIN — MISOPROSTOL 200 MCG: 200 TABLET ORAL at 06:02

## 2021-06-19 RX ADMIN — EPHEDRINE SULFATE 25 MG: 50 INJECTION INTRAVENOUS at 12:18

## 2021-06-19 RX ADMIN — LIDOCAINE HYDROCHLORIDE,EPINEPHRINE BITARTRATE 3 ML: 15; .005 INJECTION, SOLUTION EPIDURAL; INFILTRATION; INTRACAUDAL; PERINEURAL at 06:31

## 2021-06-19 RX ADMIN — SODIUM CHLORIDE, POTASSIUM CHLORIDE, SODIUM LACTATE AND CALCIUM CHLORIDE 1000 ML: 600; 310; 30; 20 INJECTION, SOLUTION INTRAVENOUS at 13:23

## 2021-06-19 NOTE — ANESTHESIA PREPROCEDURE EVALUATION
Relevant Problems   CARDIAC   (positive) Chronic hypertension       Physical Exam    Airway   Mallampati: II  TM distance: >3 FB  Neck ROM: full       Cardiovascular - normal exam  Rhythm: regular  Rate: normal     Dental - normal exam           Pulmonary   Breath sounds clear to auscultation     Abdominal    Neurological - normal exam                 Anesthesia Plan    ASA 2       Plan - epidural   Neuraxial block will be labor analgesia          Plan Factors:   Patient was not previously instructed to abstain from smoking on day of procedure.  Patient did not smoke on day of procedure.                Informed Consent:    Anesthetic plan and risks discussed with patient.    Use of blood products discussed with: patient whom consented to blood products.

## 2021-06-19 NOTE — PROGRESS NOTES
Patient has received 3 doses of cytotec.   Cervix 1/70/-2.   Cooks catheter placed 40/60 without difficulty.    services offered, patient declines for now.

## 2021-06-19 NOTE — H&P
History and Physical      Natalya Clifton is a 25 y.o. female  at 32w6d who presents for fetal demise. Patient has known CHTN and hyperthyroidism. She does not take medication for either condition as they are currently well controlled. She presented to [facility] for NST today secondary to monitoring for CHTN and heart tones were not found. Therefore ultrasound was performed and demonstrated a fetal demise. Denies fever/chills.     Subjective:   negative  For CTXS, some cramping. States she did feel one big pain yesterday but it went away.   negative Feels pain   negative for LOF  negative for vaginal bleeding.   negative for fetal movement    ROS: Pertinent items are noted in HPI.    Past Medical History:   Diagnosis Date    Chronic hypertension 2021    Graves disease 2021    Heart murmur      History reviewed. No pertinent surgical history.  OB History    Para Term  AB Living   2       1     SAB TAB Ectopic Molar Multiple Live Births   1                # Outcome Date GA Lbr Gene/2nd Weight Sex Delivery Anes PTL Lv   2 Current            1 SAB 10/18/20             Social History     Socioeconomic History    Marital status: Single     Spouse name: Not on file    Number of children: Not on file    Years of education: Not on file    Highest education level: Not on file   Occupational History    Not on file   Tobacco Use    Smoking status: Never Smoker    Smokeless tobacco: Never Used   Vaping Use    Vaping Use: Never used   Substance and Sexual Activity    Alcohol use: Not Currently    Drug use: Never    Sexual activity: Not on file   Other Topics Concern    Not on file   Social History Narrative    Not on file     Social Determinants of Health     Financial Resource Strain:     Difficulty of Paying Living Expenses:    Food Insecurity:     Worried About Running Out of Food in the Last Year:     Ran Out of Food in the Last Year:    Transportation Needs:     Lack of Transportation  "(Medical):     Lack of Transportation (Non-Medical):    Physical Activity:     Days of Exercise per Week:     Minutes of Exercise per Session:    Stress:     Feeling of Stress :    Social Connections:     Frequency of Communication with Friends and Family:     Frequency of Social Gatherings with Friends and Family:     Attends Jain Services:     Active Member of Clubs or Organizations:     Attends Club or Organization Meetings:     Marital Status:    Intimate Partner Violence:     Fear of Current or Ex-Partner:     Emotionally Abused:     Physically Abused:     Sexually Abused:      Allergies: Cefaclor and Penicillins    Current Facility-Administered Medications:     hydrOXYzine HCl (ATARAX) tablet 50 mg, 50 mg, Oral, Q6HRS PRN, Henrietta Cleveland D.O.    fentaNYL (SUBLIMAZE) injection 100 mcg, 100 mcg, Intravenous, Q HOUR PRN, Henrietta Cleveland D.O.    ibuprofen (MOTRIN) tablet 600 mg, 600 mg, Oral, Q6HRS PRN, Flory Romo A.P.R.N., 600 mg at 06/18/21 2122    Prenatal care with [facility] with following problems:  Patient Active Problem List    Diagnosis Date Noted    Graves disease 04/12/2021    Uterine fibroid-per pt as told by [facility], no note in US x 2 but can palpate 04/12/2021    Chronic hypertension 04/12/2021               Objective:      /79   Pulse (!) 108   Temp 36.9 °C (98.4 °F) (Temporal)   Ht 1.626 m (5' 4.02\")   Wt 89.4 kg (197 lb)     General:   no acute distress, alert, cooperative   Skin:   normal   HEENT:  PERRLA and EOMI   Lungs:   CTA bilateral   Heart:  RRR   Abdomen:   gravid, NT   EFW:  1800 g   Pelvis:  adequate with gynecoid pelvis   FHT:  none    Uterine Size: S>D, likely secondary to uterine fibroid    Presentations: Breech   Cervix:     Dilation: Closed    Effacement: 50%    Station:  -2    Consistency: Medium    Position: Middle     Lab Review  Lab:   Blood type: a     Recent Results (from the past 5880 hour(s))   OP Prenatal Panel-Blood Bank    Collection Time: 12/02/20 " 12:00 AM   Result Value Ref Range    Rh Grouping Only +     ABO Grouping Only a    RUBELLA ABS IGG    Collection Time: 12/02/20 12:00 AM   Result Value Ref Range    Rubella IgG Antibody Imm    HIV AG/AB COMBO ASSAY SCREENING    Collection Time: 12/02/20 12:00 AM   Result Value Ref Range    HIV Ag/Ab Combo Assay Negative    T.PALLIDUM AB EIA    Collection Time: 04/30/21  2:33 PM   Result Value Ref Range    Syphilis, Treponemal Qual Non-Reactive Non-Reactive   CBC WITHOUT DIFFERENTIAL    Collection Time: 04/30/21  2:33 PM   Result Value Ref Range    WBC 7.4 4.8 - 10.8 K/uL    RBC 3.51 (L) 4.20 - 5.40 M/uL    Hemoglobin 10.0 (L) 12.0 - 16.0 g/dL    Hematocrit 31.6 (L) 37.0 - 47.0 %    MCV 90.0 81.4 - 97.8 fL    MCH 28.5 27.0 - 33.0 pg    MCHC 31.6 (L) 33.6 - 35.0 g/dL    RDW 43.5 35.9 - 50.0 fL    Platelet Count 297 164 - 446 K/uL    MPV 12.3 9.0 - 12.9 fL   GLUCOSE 1HR GESTATIONAL    Collection Time: 04/30/21  2:33 PM   Result Value Ref Range    Glucose, Post Dose 109 70 - 139 mg/dL   FETAL HGB CALCULATION    Collection Time: 06/18/21  5:52 PM   Result Value Ref Range    Fetal Stain - FRBC 0 FETAL RBC    Adult RBCs Counted 4950 ADULT RBC    Fetal RBC Percent 0.00 %    Number Of Rh Doses Indicated Not Indicated # RhIg   Hold Blood Bank Specimen (Not Tested)    Collection Time: 06/18/21  6:25 PM   Result Value Ref Range    Holding Tube - Bb DONE    CBC WITH DIFFERENTIAL    Collection Time: 06/18/21  6:25 PM   Result Value Ref Range    WBC 8.3 4.8 - 10.8 K/uL    RBC 4.22 4.20 - 5.40 M/uL    Hemoglobin 11.5 (L) 12.0 - 16.0 g/dL    Hematocrit 36.2 (L) 37.0 - 47.0 %    MCV 85.8 81.4 - 97.8 fL    MCH 27.3 27.0 - 33.0 pg    MCHC 31.8 (L) 33.6 - 35.0 g/dL    RDW 42.3 35.9 - 50.0 fL    Platelet Count 282 164 - 446 K/uL    MPV 11.8 9.0 - 12.9 fL    Neutrophils-Polys 77.50 (H) 44.00 - 72.00 %    Lymphocytes 15.00 (L) 22.00 - 41.00 %    Monocytes 6.50 0.00 - 13.40 %    Eosinophils 0.10 0.00 - 6.90 %    Basophils 0.40 0.00 - 1.80 %     Immature Granulocytes 0.50 0.00 - 0.90 %    Nucleated RBC 0.00 /100 WBC    Neutrophils (Absolute) 6.41 2.00 - 7.15 K/uL    Lymphs (Absolute) 1.24 1.00 - 4.80 K/uL    Monos (Absolute) 0.54 0.00 - 0.85 K/uL    Eos (Absolute) 0.01 0.00 - 0.51 K/uL    Baso (Absolute) 0.03 0.00 - 0.12 K/uL    Immature Granulocytes (abs) 0.04 0.00 - 0.11 K/uL    NRBC (Absolute) 0.00 K/uL   Prothrombin Time    Collection Time: 06/18/21  6:25 PM   Result Value Ref Range    PT 12.8 12.0 - 14.6 sec    INR 0.99 0.87 - 1.13   APTT    Collection Time: 06/18/21  6:25 PM   Result Value Ref Range    APTT 28.0 24.7 - 36.0 sec   HEMOGLOBIN F STAIN (KLEIHAUER-BETKE STAIN)    Collection Time: 06/18/21  6:25 PM   Result Value Ref Range    Rh With Weak D POS     Number Of Rh Doses Indicated ZERO    SARS-COV Antigen TR: Collect dry nasal swab AND NP swab in VTM    Collection Time: 06/18/21  6:25 PM   Result Value Ref Range    SARS-CoV-2 Source Nasal Swab     SARS-COV ANTIGEN TR NotDetected Not-Detected   SARS-CoV-2 PCR (24 hour In-House): Collect NP swab in VTM    Collection Time: 06/18/21  6:25 PM    Specimen: Nasopharyngeal; Respirate   Result Value Ref Range    SARS-CoV-2 Source NP Swab        BSUS performed per patients wishes demonstrating findings consistent with [facility] (no movement and no cardiac activity. Fluid subjectively low)     Assessment:   Natalya Clifton at 32w6d  Labor status: Not in labor.  Obstetrical history significant for   Patient Active Problem List    Diagnosis Date Noted    Graves disease 04/12/2021    Uterine fibroid-per pt as told by [facility], no note in US x 2 but can palpate 04/12/2021    Chronic hypertension 04/12/2021   .      Plan:     Admit to L&D  Fetal demise- KB and coags ordered. Plan to induce labor with cytotec and pitocin/ AROM when appropriate. Discussed risk of head entrapment with patient and she expresses understanding and would still like to attempt vaginal delivery. TORCH panel not ordered secondary  to ACOG guidelines and low suspicion for infection. Patient does want genetic testing and we will plan to collect placental tissue and send with ANOVERA.  services offered and patient states she would like to wait until after the baby is born.          [facility]

## 2021-06-19 NOTE — CARE PLAN
Problem: Risk for Infection and Impaired Wound Healing  Goal: Patient will remain free from infection  Outcome: Met  Note: Pt afebrile, no abdominal tenderness noted.     Problem: Pain  Goal: Patient's pain will be alleviated or reduced to the patient’s comfort goal  Outcome: Met  Note: Pain medication options discussed with pt. Pt desires epidural when in active labor.

## 2021-06-19 NOTE — ANESTHESIA PROCEDURE NOTES
Epidural Block    Date/Time: 6/19/2021 6:31 AM  Performed by: Rich Sellers M.D.  Authorized by: Rich Sellers M.D.     Start Time:  6/19/2021 6:31 AM  End Time:  6/19/2021 6:35 AM  Reason for Block: labor analgesia    patient identified, IV checked, site marked, risks and benefits discussed, surgical consent, monitors and equipment checked, pre-op evaluation and timeout performed    Patient Position:  Sitting  Prep: Betadine    Monitoring:  Blood pressure and continuous pulse oximetry  Approach:  Midline  Location:  L3-L4  Injection Technique:  SHAINA air  Strength:  1%  Dose:  3ml  Needle Type:  Tuohy  Needle Gauge:  17 G  Needle Length:  3.5 in  Loss of resistance::  5  Catheter Size:  19 G  Catheter at Skin Depth:  15  Test Dose Result:  Negative

## 2021-06-19 NOTE — PROGRESS NOTES
1720- POC discussed with pt    1735- Md at BS with confirmation US for fetal demise. MD discussed POC and pt questions were answered.     1900- BS report given

## 2021-06-19 NOTE — PROGRESS NOTES
"Late entry: Summary of events:    1900- Received report. Assumed care of pt. Pt and  resting. POC discussed- will administer misoprostol as ordered. Morturary options discussed. Pt denies needs at this time. Encouraged to call with needs. Will monitor.    0100- RN in pt room. Pts parents and  at . Family has many questions about vaginal breech delivery, u/s performed on Wednesday, which was considered normal and then sent home. BPP discussed. Will have Dr. Cleveland come talk to patient and family.    0130- Dr. Cleveland at . Questions about uterine fibroid, vaginal breech delivery, BPP answered. FOB has many questions and concerns about the BPP and how it was performed. RN and Dr. Cleveland re discussed how BPP's are performed and the results. FOB requesting medical records- process discussed- pt will have to call medical records and request.    0200- RN back in room. Pt crying- appropriate behavior. Pt stated that \" he just doesn't understand, he doesn't get it.\" Pt stated that she understands the u/s, but is sad about her situation. RN spent extensive amount of time with patient and encouraged her to express her feelings. Pt felt all her questions were answered. Pt encouraged to call with needs.    0630- Dr. Sellers at  for epidural.    0715- Report to RASHEED Way    "

## 2021-06-19 NOTE — PROGRESS NOTES
0740- Cooks balloon placced by MD 40 in uterine 60 in vaginal    0800- PT SOB with chest pain. MD and RN to BS, IV fluid bolus given for low BP. Pulse ox placed. 10 cc fluid remoced from vaginal balloon. Ephedrine at BS as needed.     0820- Pt O2 saturation 93-96%. Pt feels like she can take a deep breath comfortably and does not feel light headed or dizzy    0842- Dr. Rock contacted regarding EKG and thyroid labs. MD states that he will place those now.     1000- MD contacted regarding next dose of cytotec. Orders to hold while pt eats lunch and then begin pitocin    1015- MD and resident reviewed EKG    1030- Pt counseled on seeing baby after delivery and what to expect at delivery.    1200- cooks balloon pulled out with gentle tug.      1215- Dr. Durbin called to BS. MD bolused epidural with lidocaine due to pt feeling pain. MD orders to give IM ephedrine 25mg to offset hypotension     1235- Dr. Rock notified of pt balloon out, sve 4/70/-2 with bbw. MD states he does not want to break water yet and to allow pt to eat before starting pit.    1520- Dr. Rock notified of pt tachycardia, afebrile with BP WNL. No new orders.     1540- PT called out stating she felt bleeding, small amount of bleeding noted on pad with 2 half dollar sized clots. SVE 5/80/-2. Pt was cleaned up and given ice chips.     1650- SVE and pt pressed epidural bolus button.     1905- Report given to ANALI Aleman RN

## 2021-06-20 VITALS
HEIGHT: 64 IN | SYSTOLIC BLOOD PRESSURE: 122 MMHG | DIASTOLIC BLOOD PRESSURE: 83 MMHG | TEMPERATURE: 97.8 F | BODY MASS INDEX: 33.63 KG/M2 | HEART RATE: 67 BPM | OXYGEN SATURATION: 95 % | WEIGHT: 197 LBS

## 2021-06-20 PROCEDURE — 700102 HCHG RX REV CODE 250 W/ 637 OVERRIDE(OP): Performed by: OBSTETRICS & GYNECOLOGY

## 2021-06-20 PROCEDURE — 303615 HCHG EPIDURAL/SPINAL ANESTHESIA FOR LABOR

## 2021-06-20 PROCEDURE — 10907ZC DRAINAGE OF AMNIOTIC FLUID, THERAPEUTIC FROM PRODUCTS OF CONCEPTION, VIA NATURAL OR ARTIFICIAL OPENING: ICD-10-PCS | Performed by: OBSTETRICS & GYNECOLOGY

## 2021-06-20 PROCEDURE — 3E033VJ INTRODUCTION OF OTHER HORMONE INTO PERIPHERAL VEIN, PERCUTANEOUS APPROACH: ICD-10-PCS | Performed by: OBSTETRICS & GYNECOLOGY

## 2021-06-20 PROCEDURE — 88307 TISSUE EXAM BY PATHOLOGIST: CPT

## 2021-06-20 PROCEDURE — 700111 HCHG RX REV CODE 636 W/ 250 OVERRIDE (IP): Performed by: ANESTHESIOLOGY

## 2021-06-20 PROCEDURE — A9270 NON-COVERED ITEM OR SERVICE: HCPCS | Performed by: OBSTETRICS & GYNECOLOGY

## 2021-06-20 PROCEDURE — 88291 CYTO/MOLECULAR REPORT: CPT

## 2021-06-20 PROCEDURE — 59409 OBSTETRICAL CARE: CPT | Performed by: OBSTETRICS & GYNECOLOGY

## 2021-06-20 PROCEDURE — 700105 HCHG RX REV CODE 258: Performed by: OBSTETRICS & GYNECOLOGY

## 2021-06-20 PROCEDURE — 88233 TISSUE CULTURE SKIN/BIOPSY: CPT | Mod: 91

## 2021-06-20 PROCEDURE — 700111 HCHG RX REV CODE 636 W/ 250 OVERRIDE (IP): Performed by: OBSTETRICS & GYNECOLOGY

## 2021-06-20 PROCEDURE — 88280 CHROMOSOME KARYOTYPE STUDY: CPT

## 2021-06-20 PROCEDURE — 88261 CHROMOSOME ANALYSIS 5: CPT

## 2021-06-20 RX ORDER — ACETAMINOPHEN 325 MG/1
650 TABLET ORAL EVERY 4 HOURS PRN
Status: DISCONTINUED | OUTPATIENT
Start: 2021-06-20 | End: 2021-06-20 | Stop reason: HOSPADM

## 2021-06-20 RX ORDER — SODIUM CHLORIDE, SODIUM LACTATE, POTASSIUM CHLORIDE, CALCIUM CHLORIDE 600; 310; 30; 20 MG/100ML; MG/100ML; MG/100ML; MG/100ML
INJECTION, SOLUTION INTRAVENOUS PRN
Status: DISCONTINUED | OUTPATIENT
Start: 2021-06-20 | End: 2021-06-20 | Stop reason: HOSPADM

## 2021-06-20 RX ORDER — BUPIVACAINE HYDROCHLORIDE 2.5 MG/ML
INJECTION, SOLUTION EPIDURAL; INFILTRATION; INTRACAUDAL PRN
Status: DISCONTINUED | OUTPATIENT
Start: 2021-06-20 | End: 2021-06-20 | Stop reason: SURG

## 2021-06-20 RX ORDER — IBUPROFEN 600 MG/1
600 TABLET ORAL EVERY 6 HOURS PRN
Status: DISCONTINUED | OUTPATIENT
Start: 2021-06-20 | End: 2021-06-20 | Stop reason: HOSPADM

## 2021-06-20 RX ORDER — OXYCODONE HYDROCHLORIDE AND ACETAMINOPHEN 5; 325 MG/1; MG/1
1 TABLET ORAL EVERY 4 HOURS PRN
Status: DISCONTINUED | OUTPATIENT
Start: 2021-06-20 | End: 2021-06-20 | Stop reason: HOSPADM

## 2021-06-20 RX ORDER — IBUPROFEN 600 MG/1
600 TABLET ORAL EVERY 6 HOURS PRN
Status: DISCONTINUED | OUTPATIENT
Start: 2021-06-20 | End: 2021-06-20

## 2021-06-20 RX ORDER — DOCUSATE SODIUM 100 MG/1
100 CAPSULE, LIQUID FILLED ORAL 2 TIMES DAILY PRN
Status: DISCONTINUED | OUTPATIENT
Start: 2021-06-20 | End: 2021-06-20 | Stop reason: HOSPADM

## 2021-06-20 RX ORDER — ACETAMINOPHEN 325 MG/1
650 TABLET ORAL EVERY 4 HOURS PRN
Qty: 30 TABLET | Refills: 0 | Status: SHIPPED | OUTPATIENT
Start: 2021-06-20

## 2021-06-20 RX ORDER — IBUPROFEN 600 MG/1
600 TABLET ORAL EVERY 6 HOURS PRN
Qty: 30 TABLET | Refills: 2 | Status: SHIPPED | OUTPATIENT
Start: 2021-06-20

## 2021-06-20 RX ADMIN — OXYTOCIN 125 ML/HR: 10 INJECTION, SOLUTION INTRAMUSCULAR; INTRAVENOUS at 04:39

## 2021-06-20 RX ADMIN — FENTANYL CITRATE 100 MCG: 50 INJECTION, SOLUTION INTRAMUSCULAR; INTRAVENOUS at 02:07

## 2021-06-20 RX ADMIN — IBUPROFEN 600 MG: 600 TABLET, FILM COATED ORAL at 05:34

## 2021-06-20 RX ADMIN — BUPIVACAINE HYDROCHLORIDE 10 ML: 2.5 INJECTION, SOLUTION EPIDURAL; INFILTRATION; INTRACAUDAL; PERINEURAL at 02:07

## 2021-06-20 RX ADMIN — IBUPROFEN 600 MG: 600 TABLET, FILM COATED ORAL at 10:47

## 2021-06-20 RX ADMIN — OXYTOCIN 2000 ML/HR: 10 INJECTION, SOLUTION INTRAMUSCULAR; INTRAVENOUS at 03:07

## 2021-06-20 NOTE — DISCHARGE SUMMARY
Uncomplicated Vaginal Delivery Discharge Summary    Natalya Clifton   Admit date: 21  Admitting diagnosis: IUFD    Discharge Date: 21  Discharge Diagnosis:s/p vaginal delivery of IUFD    Past Medical History:   Diagnosis Date   • Chronic hypertension 2021   • Graves disease 2021   • Heart murmur      OB History    Para Term  AB Living   2 1   1 1     SAB TAB Ectopic Molar Multiple Live Births   1       0        # Outcome Date GA Lbr Gene/2nd Weight Sex Delivery Anes PTL Lv   2  21 33w1d  2.095 kg (4 lb 9.9 oz)  Vag-Breech EPI N FD   1 SAB 10/18/20               Cefaclor and Penicillins  Patient Active Problem List    Diagnosis Date Noted   • Graves disease 2021   • Uterine fibroid-per pt as told by [facility], no note in US x 2 but can palpate 2021   • Chronic hypertension 2021       HOSPITAL COURSE:  25 y.o. , was admitted with the above mentioned diagnosis, underwent vaginal, spontaneous. Her labor was induced with misoprostol, balloon, and Pitocin and she received an epidural for pain control. She progressed to complete to deliver a nonviable breech infant on  2021 at 0256. Apgars at 1 and 5 minutes were 0 and 0. She had no lacerations.  The EBL for the delivery was 100ccs.       The mother’s postpartum care has been uncomplicated. She has been meeting all of her postpartum milestones including voiding, ambulating without syncope, tolerating PO, and pain is well controlled with PO medication. The patient was ready to be discharged on postpartum day #0 and desires to leave.  She is unsure what she would like to use for contraception.  Plans to be absent at the time.  She reports that she has good support with family and friends but is interested in referral for further counseling/support.  We will reach out to Jason Jett to get [facility] plugged [facility] at [facility]. Her vital signs are stable. Her lochia is appropriate and her  fundus is firm and at the umbilicus. She is discharged home in stable condition, tdap was administered.  Patient without complaints today and desires discharge.     Vitals:    06/20/21 0354 06/20/21 0414 06/20/21 0434 06/20/21 1015   BP: 126/72 130/80 119/73 122/83   Pulse: 79 81 74 67   Temp:    36.6 °C (97.8 °F)   TempSrc:    Temporal   SpO2:       Weight:       Height:         Exam:  General: No acute distress  Chest: Nonlabored breathing on room air  Abdomen: Firm uterus below the level of the umbilicus, appropriately tender   minimal bleeding    Labs:    Lab Results   Component Value Date/Time    WBC 8.3 06/18/2021 06:25 PM    RBC 4.22 06/18/2021 06:25 PM    HEMOGLOBIN 11.5 (L) 06/18/2021 06:25 PM    HEMATOCRIT 36.2 (L) 06/18/2021 06:25 PM    MCV 85.8 06/18/2021 06:25 PM    MCH 27.3 06/18/2021 06:25 PM    MCHC 31.8 (L) 06/18/2021 06:25 PM    MPV 11.8 06/18/2021 06:25 PM           DISCHARGE MEDICATIONS:  1. Ibuprofen 600mg PO q 6 hours PRN as needed for pain.  2. Prenatal Vitamins 1 tab PO qday  3. Colace 100mg PO qday       DISPOSITION AND RECOMMENDATIONS:  1. Diet: Full, well-rounded, healthy diet. Advised to eat a diet rich in iron.  2. Activity: As tolerated. Advised nothing in the vagina for 4 weeks.  3. Followup: We will recheck anterior Jett to get an appointment as soon as possible.  Also plan to see patient back in clinic in 1 to 2 weeks as well for her routine 6 to 5 to 6-week postpartum visit.   4. Contraception: Patient is unsure when she desires for contraception but will be abstinent for now.  Discussed that we can help her with this at her postpartum visits.    5. Advised to continue prenatal vitamins and stool softener as needed for constipation  6. Support for breastfeeding and other resources within [facility] have been provided.  7. Routine precautions for bleeding of greater than two pads per hour for two hours or more. Signs of infection including foul smelling discharge, fever  greater than 100.4, or chills. Discussed signs of postpartum depression including signs of feeling down, desire to harm herself or others. Told to return to OB triage if any of these symptoms occur or she has other concerns.

## 2021-06-20 NOTE — ANESTHESIA TIME REPORT
Anesthesia Start and Stop Event Times     Date Time Event    6/19/2021 0630 Ready for Procedure     0630 Anesthesia Start    6/20/2021 0256 Anesthesia Stop        Responsible Staff  06/19/21 to 06/20/21    Name Role Begin End    Rich Sellers M.D. Anesth 0630 0659    Winston Durbin M.D. Anesth 0659 0256        Preop Diagnosis (Free Text):  Pre-op Diagnosis             Preop Diagnosis (Codes):    Post op Diagnosis  Fetal demise > 22 weeks, delivered, current hospitalization      Premium Reason  E. Weekend    Comments:

## 2021-06-20 NOTE — PROGRESS NOTES
Cervix-6 cm / 100% effaced/0 station    AROM-clear fluid    Pitocin had been turned off due to patient discomfort-was previously on 5 to 10 milliunits/min

## 2021-06-20 NOTE — H&P
History and physical;    Natalya Clifton is a 25 y.o. female  at 33w1d. Fetal demise diagnosed on 21 at HR PC-32 weeks growth measurements no fetal cardiac motion which was confirmed by Dr. Cleveland on this admission.  Patient received three doses of Cytotec and balloon catheter was placed in her cervix.    Patient Active Problem List    Diagnosis Date Noted   • Graves disease 2021   • Uterine fibroid-per pt as told by [facility], no note in US x 2 but can palpate 2021   • Chronic hypertension 2021       Review of systems; denies vaginal bleeding, leakage of fluid, uterine contractions, fever chills or abdominal pain  Past Medical History:   Diagnosis Date   • Chronic hypertension 2021   • Graves disease 2021   • Heart murmur      History reviewed. No pertinent surgical history.  Cefaclor and Penicillins  Social History     Socioeconomic History   • Marital status: Single     Spouse name: Not on file   • Number of children: Not on file   • Years of education: Not on file   • Highest education level: Not on file   Occupational History   • Not on file   Tobacco Use   • Smoking status: Never Smoker   • Smokeless tobacco: Never Used   Vaping Use   • Vaping Use: Never used   Substance and Sexual Activity   • Alcohol use: Not Currently   • Drug use: Never   • Sexual activity: Not on file   Other Topics Concern   • Not on file   Social History Narrative   • Not on file     Social Determinants of Health     Financial Resource Strain:    • Difficulty of Paying Living Expenses:    Food Insecurity:    • Worried About Running Out of Food in the Last Year:    • Ran Out of Food in the Last Year:    Transportation Needs:    • Lack of Transportation (Medical):    • Lack of Transportation (Non-Medical):    Physical Activity:    • Days of Exercise per Week:    • Minutes of Exercise per Session:    Stress:    • Feeling of Stress :    Social Connections:    • Frequency of Communication with  "Friends and Family:    • Frequency of Social Gatherings with Friends and Family:    • Attends Tenriism Services:    • Active Member of Clubs or Organizations:    • Attends Club or Organization Meetings:    • Marital Status:    Intimate Partner Violence:    • Fear of Current or Ex-Partner:    • Emotionally Abused:    • Physically Abused:    • Sexually Abused:          Physical examination;  Alert and oriented x3  Gen.-well-developed well-nourished female in no apparent distress  HEENT-normocephalic, nontraumatic,EOMI,PERRLA  /77   Pulse 80   Temp 36.8 °C (98.3 °F) (Temporal)   Ht 1.626 m (5' 4.02\")   Wt 89.4 kg (197 lb)   LMP 10/31/2020   SpO2 95%   BMI 33.80 kg/m²   Skin is warm and dry  Back-negative for CVA tenderness  Cardiovascular-regular rate and rhythm, normal S1-S2 no murmurs gallops  Lungs-clear to auscultation bilaterally  Abdomen-nondistended positive bowel sounds soft nontender without masses or hepatosplenomegaly  Cervix-long closed on admission  Extremities without cyanosis clubbing or edema  Neurologic grossly intact    Labs;          Impression;  IUP AT 33w1d  Fetal demise  Breech fetal presentation    Plan;  Induction of labor-anticipate vaginal delivery  Grief counseling inpatient and outpatient  Specimen from placenta to be sent to [facility] for genetics evaluation      Sam Rock MD  "

## 2021-06-20 NOTE — PROGRESS NOTES
0830- On call  requested to come in. States that he is on his way    0900-  at BS.     0920- Heidy harp coming to BS to talk to pts    1120- Discharge instructions given to pt and pt walked off unit with parents. Counseled on when to call MD and when to follow up.

## 2021-06-20 NOTE — PROGRESS NOTES
"      Spiritual Care Note    Patient Information     Patient's Name: Natalya Clifton   MRN: [id number]    YOB: 1995   Age and Gender: 25 y.o. female   Service Area: Labor and Delivery [facility]   Room (and Bed): [facility]/   Ethnicity or Nationality:     Primary Language: English   Yazidi/Spiritual preference: Scientologist   Place of Residence: [place]   Family/Friends/Others Present: Yes   Clinical Team Present: Yes   Medical Diagnosis(-es)/Procedure(s): Fetal Demise   Code Status: Full Code    Date of Admission: 6/18/2021   Length of Stay: 2 days        Spiritual Care Provider Information:  Name of Spiritual Care Provider: Alfredito Cleveland  Title of Spiritual Care Provider: Associate   Phone Number: [phone]  E-mail: [email or web address]    Total time : 30 minutes    Spiritual Screen Results:    Gen Nursing        Palliative Care         Encounter/Request Information    Encounter/Request Type   Visited With: Patient and family together    Nature of the Visit: Initial, Call back    Crisis Visit: Major loss (Fetal Demise)    Referral From/ Origin of Request: Verbal staff    Religous Needs/Values    Yazidi Needs Visit  Yazidi Needs: Prayer    Ritual Needs Visit  Ritual Needs: Jehovah's witness    Spiritual Assessment     Spiritual Care Encounters  Observations/Symptoms: Accepting, Discouraged, Frustration, Pain, Sadness, Tearful, Thankfulness    Interacton/Conversation:  was called in to do a blessing on the Pt's baby.  did so and follow up with the Pt. The Pt's father and mother were with her at the time. Pt shared her thoughts and frustrations regarding the loss of her baby.  listened empathetically. Pt welcomed his thoughts -  spoke about his expereince with death and his belief that God does not \"take us\" as much as \"recieves us\" when (for whatever reason) our bodies give out. 's words appeard to bring comfort to the Pt.  prayed " for and blessed the Pt. Pt and Pt's family thanked him for coming.     Assessment: Need, Distress, Despair    Need: Decision Making, Maria Isabel Issues, Seeking Spiritual Assistance and Support, Family Issues/Concern    Distress: Anxiety about the Future, Anger at God, Anger/Resentment/Bitterness, Coping, Doubt, Disbelief, Conflicted or Challenged Beliefs, Grief/Loss/Bereavement, Feeling that God/Life has been Unfair, Overwhelmed, Search for Inner Peace and [place], Search for Meaning/Purpose, Seeking Safety to Share Emotions, Suffering, Unanswered Prayers, Upsetting Diagnosis/Prognosis, Why Questions    Despair: Emptiness, Hopelessness/Despair, Lack of Serenity    Intended Effects: Build Relationship of Care and Support, Convey a Calming Presence, Demonstrate Caring and Concern, Establish Rapport and Connectedness, Maria Isabel Affirmation, Helping Someone Feel Comforted, Journeying With Someone in Grief Process, Meaning-Making, Preserve Dignity and Respect,   Promote a Sense of Peace    Interventions: Compassionate Presence, Companionship, Theological Redirection, Prayer, Wendel    Outcomes: Coping, Progress with Grief, Spiritual Comfort    Notes:

## 2021-06-20 NOTE — DISCHARGE PLANNING
Care Transition Team Discharge Planning    Anticipated Discharge Disposition: TBD    Action: LSw received request to meet w/ pt at bedside d/t fetal demise.  has been to room.    LSw met w/ pt and family at bedside. Lsw introduced self, and discussed next steps as pt indicated she was ready for this information. LSw provided grief/mortuary pamphlet and discussed next steps.     Pt indicates they have not lived here for very long. They moved from [place] and originally from [place].      LSw provided support and condolences. Pt and family did not have any needs at this time.      Barriers to Discharge: TBD    Plan: LSw will continue to follow, and assist w/ d/c planning.

## 2021-06-20 NOTE — PROGRESS NOTES
Late entry: Summary of events:    - Received report from RASHE[address]. Assumed care of pt. Pt denies needs at this time. Encouraged to call with needs.    - Pt c/o abdominal pain, tender to palpation, limited resting tone noted. Pitocin decreased. Dr. Rock notified.    011- Pt c/o increased abdominal pain, increased tenderness noted upon palpation. Rigid abdomen noted.Pitocin turned off. Dr. Rock notified. Will come evaluate pt.    0125- Dr. Rock at . VE /-1, AROM.    0225- RN called to room, pt c/o pressure, SVE complete, fetal parts noted in vagina. Dr. Rock called to room for delivery.    0256-  of a non viable male by Dr. Rock.     and pts parents at . Pt tearful, held infant. Support provided to pt and family.    655- Report to [address]

## 2021-06-20 NOTE — ANESTHESIA TIME REPORT
Anesthesia Start and Stop Event Times     Date Time Event    6/19/2021 0630 Ready for Procedure     0630 Anesthesia Start        Responsible Staff  06/19/21    Name Role Begin End    Rich Sellers M.D. Anesth 0630 0659    Winston Durbin M.D. Anesth 0659         Preop Diagnosis (Free Text):  Pre-op Diagnosis             Preop Diagnosis (Codes):    Post op Diagnosis  Fetal demise > 22 weeks, delivered, current hospitalization      Premium Reason  E. Weekend    Comments: stop time 0256 6/20/21

## 2021-06-20 NOTE — L&D DELIVERY NOTE
DATE OF SERVICE:  2021     This patient is a 25-year-old female  2, para 0-0-1-0 at 33 and 1/7th   weeks' by dates.  The patient was seen at [facility] where perinatologist confirmed   fetal demise with growth measurements at 32 weeks' gestation.  The patient was   sent over to labor and delivery for cervical ripening and anticipated vaginal   delivery.  The patient initially received Cytotec x3 doses and then a Cook's   catheter was placed into the cervix.  She was started on Pitocin augmentation   of labor, epidural was also placed.  The patient dilated to complete and   complete and delivered via breech fetal extraction.  The umbilical cord was   clamped and cut and the infant handed off.  The placenta delivered   spontaneously and intact.  A 1 inch x 1 inch portion of the placenta was   transected and sent to [facility] for genetic evaluation.  Gross examination of   the placenta, umbilical cord and fetus showed no gross abnormalities.  There   is some sloughing of the skin from the fetus, but no other gross abnormalities   observed.  Apgar scores of 0 and 0.  The patient and family members will be   referred to inpatient and outpatient grief counseling.  The patient is stable   at this juncture in time.  EBL less than 100 mL.        ______________________________  MD LILIANA BEDOLLA/ZACH    DD:  2021 03:31  DT:  2021 04:32    Job#:  [id number]

## 2021-06-20 NOTE — ANESTHESIA POSTPROCEDURE EVALUATION
Patient: Natalya Clifton    Procedure Summary     Date: 06/19/21 Room / Location:     Anesthesia Start: 0630 Anesthesia Stop:     Procedure: Labor Epidural Diagnosis:     Scheduled Providers:  Responsible Provider: Winston Durbin M.D.    Anesthesia Type: epidural ASA Status: 2          Final Anesthesia Type: epidural  Last vitals  BP   Blood Pressure: 128/77    Temp   36.8 °C (98.3 °F)    Pulse   80   Resp        SpO2   95 %      Anesthesia Post Evaluation    Patient location during evaluation: PACU  Patient participation: complete - patient participated  Level of consciousness: awake and alert    Airway patency: patent  Anesthetic complications: no  Cardiovascular status: hemodynamically stable  Respiratory status: acceptable  Hydration status: euvolemic    PONV: none          No complications documented.     Nurse Pain Score: 1 (NPRS)

## 2021-06-21 ENCOUNTER — TELEPHONE (OUTPATIENT)
Dept: OBGYN | Facility: CLINIC | Age: 26
End: 2021-06-21

## 2021-06-21 LAB — PATHOLOGY CONSULT NOTE: NORMAL

## 2021-06-21 NOTE — TELEPHONE ENCOUNTER
Pt's mother Kayla called to follow-up regarding if we had received Pt's disability form. Kayla states that [facility] faxed it over to us yesterday. Informed Kayla that we don't have it yet as I checked Pt's media. Provided our fax number to fax over the paper works. Kayla has no further questions.

## 2021-06-21 NOTE — TELEPHONE ENCOUNTER
Returning Zaria from Histology Dept. Call's regarding Pt's specimen. Zaria would like to confirm regarding specimen sent in yesterday by Dr. Rock. Confirmed with Dr. Rock, informed Zaria that one genetic testing is ok. Zaria has no other further questions.

## 2021-06-23 ENCOUNTER — TELEPHONE (OUTPATIENT)
Dept: OBGYN | Facility: CLINIC | Age: 26
End: 2021-06-23

## 2021-06-23 NOTE — TELEPHONE ENCOUNTER
Patient called stating wanting to know if our office has received her Short term Disability. Informed patient that we received paperwork 6/21/21 and that it takes 10 business days to complete, once completed we will give pt call and fax over paperwork to provided number. Pt understood no further questions asked.

## 2021-07-07 ENCOUNTER — TELEPHONE (OUTPATIENT)
Dept: OBGYN | Facility: CLINIC | Age: 26
End: 2021-07-07

## 2021-07-07 NOTE — TELEPHONE ENCOUNTER
Mandi from Birth certificates called today. Mandi was asking if Dr. Cleveland was in office today. Mandi was asking if Dr. Cleveland would be able to sign off on a fetal demise death certificate, it was sent to her work que for her to complete. Let Mandi know that I will go ahead and ask Dr. Cleveland to get that filled out for her.

## 2021-07-08 ENCOUNTER — TELEPHONE (OUTPATIENT)
Dept: OBGYN | Facility: CLINIC | Age: 26
End: 2021-07-08

## 2021-07-08 NOTE — TELEPHONE ENCOUNTER
07/08/21 @ 9892 Called pt to inform her that her FMLA is signed, complete and faxed. PT states she does not need the originals. Pt has no further Questions